# Patient Record
Sex: MALE | Race: WHITE | NOT HISPANIC OR LATINO | Employment: FULL TIME | RURAL
[De-identification: names, ages, dates, MRNs, and addresses within clinical notes are randomized per-mention and may not be internally consistent; named-entity substitution may affect disease eponyms.]

---

## 2017-07-24 ENCOUNTER — OFFICE VISIT (OUTPATIENT)
Dept: RETAIL CLINIC | Facility: CLINIC | Age: 27
End: 2017-07-24

## 2017-07-24 DIAGNOSIS — H61.21 HEARING LOSS DUE TO CERUMEN IMPACTION, RIGHT: Primary | ICD-10-CM

## 2017-07-24 PROCEDURE — 69210 REMOVE IMPACTED EAR WAX UNI: CPT | Performed by: NURSE PRACTITIONER

## 2017-07-25 VITALS — TEMPERATURE: 98.1 F

## 2017-07-25 NOTE — PROGRESS NOTES
Subjective   Bhavik Dewey is a 26 y.o. male.     Chief Complaint   Patient presents with   • Cerumen Impaction        HPI Comments: Was swimming with his children a few days ago.  Daughter forcefully squirted water directly in his right ear and he has had decreased hearing since that time.  No pain or other symptoms.  Did try to use OTC Swimmer's Ear drops without success/relief.   Has not had drainage or other problems from ear.  Decreased hearing makes it hard for him to perform work duties because he is a .        The following portions of the patient's history were reviewed and updated as appropriate: allergies, current medications, past family history, past medical history, past social history, past surgical history and problem list.    Review of Systems   HENT: Positive for hearing loss (right ear). Negative for congestion, ear discharge, ear pain, rhinorrhea, sinus pressure, sneezing and sore throat.    Neurological: Negative for dizziness and light-headedness.       Objective   Allergies   Allergen Reactions   • Amoxicillin Hives   • Ceclor [Cefaclor] Hives       Physical Exam   HENT:   Right Ear: No tenderness. A foreign body (large aayush cerumen impaction - unable to visualize TM pre-removal) is present. Decreased hearing is noted.   Left Ear: Tympanic membrane and ear canal normal.   Mouth/Throat: Uvula is midline, oropharynx is clear and moist and mucous membranes are normal.   Lymphadenopathy:     He has no cervical adenopathy.       Ear Cerumen Removal Instrumentation  Date/Time: 7/24/2017 3:00 PM  Performed by: BEAR CARR  Authorized by: BEAR CARR  Consent: Verbal consent obtained.  Risks and benefits: risks, benefits and alternatives were discussed  Consent given by: patient  Local anesthetic: none  Patient sedated: no  Patient tolerance: Patient tolerated the procedure well with no immediate complications  Comments: Large cerumen impaction removed using irrigation of  right ear canal using warm water and hydrogen peroxide and curette.  Impaction was difficult to remove and took approx 20 minutes of irrigation/curette use, but pt. Tolerated well.   Post removal, canal with slight erythema but skin intact.  TM normal.  Pt. Reported immediate improvement in hearing and denied complaints.             Assessment/Plan   Bhavik was seen today for cerumen impaction.    Diagnoses and all orders for this visit:    Hearing loss due to cerumen impaction, right

## 2018-09-20 ENCOUNTER — OFFICE VISIT (OUTPATIENT)
Dept: PULMONOLOGY | Facility: CLINIC | Age: 28
End: 2018-09-20

## 2018-09-20 VITALS
TEMPERATURE: 98.2 F | BODY MASS INDEX: 31.52 KG/M2 | HEART RATE: 65 BPM | OXYGEN SATURATION: 98 % | RESPIRATION RATE: 16 BRPM | DIASTOLIC BLOOD PRESSURE: 70 MMHG | HEIGHT: 68 IN | WEIGHT: 208 LBS | SYSTOLIC BLOOD PRESSURE: 120 MMHG

## 2018-09-20 DIAGNOSIS — R06.02 SOB (SHORTNESS OF BREATH): Primary | ICD-10-CM

## 2018-09-20 DIAGNOSIS — R04.2 HEMOPTYSIS: ICD-10-CM

## 2018-09-20 DIAGNOSIS — J44.9 CHRONIC OBSTRUCTIVE PULMONARY DISEASE, UNSPECIFIED COPD TYPE (HCC): ICD-10-CM

## 2018-09-20 LAB
ALBUMIN SERPL-MCNC: 4.8 G/DL (ref 3.2–4.8)
ALBUMIN/GLOB SERPL: 2 G/DL (ref 1.5–2.5)
ALP SERPL-CCNC: 73 U/L (ref 25–100)
ALT SERPL W P-5'-P-CCNC: 27 U/L (ref 7–40)
ANION GAP SERPL CALCULATED.3IONS-SCNC: 7 MMOL/L (ref 3–11)
APTT PPP: 28 SECONDS (ref 24–31)
AST SERPL-CCNC: 29 U/L (ref 0–33)
BASOPHILS # BLD AUTO: 0.03 10*3/MM3 (ref 0–0.2)
BASOPHILS NFR BLD AUTO: 0.3 % (ref 0–1)
BILIRUB SERPL-MCNC: 1 MG/DL (ref 0.3–1.2)
BUN BLD-MCNC: 12 MG/DL (ref 9–23)
BUN/CREAT SERPL: 14.3 (ref 7–25)
CALCIUM SPEC-SCNC: 9.4 MG/DL (ref 8.7–10.4)
CHLORIDE SERPL-SCNC: 103 MMOL/L (ref 99–109)
CO2 SERPL-SCNC: 28 MMOL/L (ref 20–31)
CREAT BLD-MCNC: 0.84 MG/DL (ref 0.6–1.3)
DEPRECATED RDW RBC AUTO: 39.9 FL (ref 37–54)
EOSINOPHIL # BLD AUTO: 0.26 10*3/MM3 (ref 0–0.3)
EOSINOPHIL NFR BLD AUTO: 2.6 % (ref 0–3)
ERYTHROCYTE [DISTWIDTH] IN BLOOD BY AUTOMATED COUNT: 12.6 % (ref 11.3–14.5)
GFR SERPL CREATININE-BSD FRML MDRD: 108 ML/MIN/1.73
GLOBULIN UR ELPH-MCNC: 2.4 GM/DL
GLUCOSE BLD-MCNC: 84 MG/DL (ref 70–100)
HCT VFR BLD AUTO: 47.9 % (ref 38.9–50.9)
HGB BLD-MCNC: 16.3 G/DL (ref 13.1–17.5)
IMM GRANULOCYTES # BLD: 0.04 10*3/MM3 (ref 0–0.03)
IMM GRANULOCYTES NFR BLD: 0.4 % (ref 0–0.6)
INR PPP: 0.98 (ref 0.91–1.09)
LYMPHOCYTES # BLD AUTO: 2.61 10*3/MM3 (ref 0.6–4.8)
LYMPHOCYTES NFR BLD AUTO: 25.7 % (ref 24–44)
MCH RBC QN AUTO: 29.9 PG (ref 27–31)
MCHC RBC AUTO-ENTMCNC: 34 G/DL (ref 32–36)
MCV RBC AUTO: 87.7 FL (ref 80–99)
MONOCYTES # BLD AUTO: 0.8 10*3/MM3 (ref 0–1)
MONOCYTES NFR BLD AUTO: 7.9 % (ref 0–12)
NEUTROPHILS # BLD AUTO: 6.47 10*3/MM3 (ref 1.5–8.3)
NEUTROPHILS NFR BLD AUTO: 63.5 % (ref 41–71)
PLATELET # BLD AUTO: 217 10*3/MM3 (ref 150–450)
PMV BLD AUTO: 12.6 FL (ref 6–12)
POTASSIUM BLD-SCNC: 3.8 MMOL/L (ref 3.5–5.5)
PROT SERPL-MCNC: 7.2 G/DL (ref 5.7–8.2)
PROTHROMBIN TIME: 10.3 SECONDS (ref 9.6–11.5)
RBC # BLD AUTO: 5.46 10*6/MM3 (ref 4.2–5.76)
SODIUM BLD-SCNC: 138 MMOL/L (ref 132–146)
WBC NRBC COR # BLD: 10.17 10*3/MM3 (ref 3.5–10.8)

## 2018-09-20 PROCEDURE — 82103 ALPHA-1-ANTITRYPSIN TOTAL: CPT | Performed by: INTERNAL MEDICINE

## 2018-09-20 PROCEDURE — 82104 ALPHA-1-ANTITRYPSIN PHENO: CPT | Performed by: INTERNAL MEDICINE

## 2018-09-20 PROCEDURE — 85025 COMPLETE CBC W/AUTO DIFF WBC: CPT | Performed by: INTERNAL MEDICINE

## 2018-09-20 PROCEDURE — 94060 EVALUATION OF WHEEZING: CPT | Performed by: INTERNAL MEDICINE

## 2018-09-20 PROCEDURE — 80053 COMPREHEN METABOLIC PANEL: CPT | Performed by: INTERNAL MEDICINE

## 2018-09-20 PROCEDURE — 94726 PLETHYSMOGRAPHY LUNG VOLUMES: CPT | Performed by: INTERNAL MEDICINE

## 2018-09-20 PROCEDURE — 85610 PROTHROMBIN TIME: CPT | Performed by: INTERNAL MEDICINE

## 2018-09-20 PROCEDURE — 85730 THROMBOPLASTIN TIME PARTIAL: CPT | Performed by: INTERNAL MEDICINE

## 2018-09-20 PROCEDURE — 99204 OFFICE O/P NEW MOD 45 MIN: CPT | Performed by: INTERNAL MEDICINE

## 2018-09-20 PROCEDURE — 36415 COLL VENOUS BLD VENIPUNCTURE: CPT | Performed by: INTERNAL MEDICINE

## 2018-09-20 PROCEDURE — 94729 DIFFUSING CAPACITY: CPT | Performed by: INTERNAL MEDICINE

## 2018-09-20 RX ORDER — ALBUTEROL SULFATE 90 UG/1
4 AEROSOL, METERED RESPIRATORY (INHALATION) ONCE
Status: COMPLETED | OUTPATIENT
Start: 2018-09-20 | End: 2018-09-20

## 2018-09-20 RX ORDER — VARENICLINE TARTRATE 1 MG/1
TABLET, FILM COATED ORAL
COMMUNITY
Start: 2018-06-22 | End: 2022-11-04

## 2018-09-20 RX ADMIN — ALBUTEROL SULFATE 4 PUFF: 90 AEROSOL, METERED RESPIRATORY (INHALATION) at 12:22

## 2018-09-20 NOTE — PATIENT INSTRUCTIONS
1. Labs today, including Alpha 1 antitrypsin level and phenotype  2. CT chest with contrast, indication: hemoptysis  3. RTC with results of CT, within a week.  4. Based on findings, we may need to consider Bronchoscopy

## 2018-09-20 NOTE — PROGRESS NOTES
"Pulmonary Clinic  Initial Office Evaluation     REFERRING PHYSICIAN:  Richard Briscoe MD    Subjective   Reason for Visit:   Mr. Bhavik Dewey is a 28 y.o. male, who presents to [facility] at [place], [place], for evaluation and management of Cough      SUBJECTIVE     Smoker, who las May, had an episode of bronchitis, that lasted few weeks. During that time, he was awakening up, coughing, and \"spitting up\" some blood, mostly blood specs, less than a teaspoon.    He had a round of antibiotics and prednisone. He got better. He has not had any hemoptysis for almost 3 months.    He continues to smoke, ~ 1 ppd. He never started the Chantix.    PMH: He  has a past medical history of Achalasia.   PSxH: He  has no past surgical history on file.     There is no immunization history on file for this patient.    Medications:     Current Outpatient Prescriptions:   •  CHANTIX CONTINUING MONTH KT 1 MG tablet, , Disp: , Rfl:   •  CHANTIX STARTING MONTH KT 0.5 MG X 11 & 1 MG X 42 tablet, , Disp: , Rfl:   No current facility-administered medications for this visit.     Allergies: He is allergic to amoxicillin and ceclor [cefaclor].   FH: His family history includes Brain cancer in his paternal grandfather; Colon cancer in his maternal grandfather; Congenital heart disease in his maternal grandfather; Diabetes in his maternal grandfather, maternal grandmother, and mother; Heart disease in his maternal grandmother; Kidney cancer in his paternal grandfather; Kidney disease in his maternal grandmother; Ovarian cancer in his paternal grandfather.   SH: He  reports that he has been smoking Electronic Cigarette.  He has a 14.00 pack-year smoking history. He has never used smokeless tobacco. He reports that he drinks about 1.2 - 1.8 oz of alcohol per week . He reports that he does not use drugs.  Other       There is no immunization history on file for this patient.     The patient's relevant past medical, " "surgical and social history were reviewed and updated in Epic as appropriate.    ROS (14):   Review of Systems   Constitutional: Negative for fever.   HENT: Positive for congestion, dental problem, sneezing and sore throat.    Respiratory: Positive for cough.    Cardiovascular: Negative for chest pain.   Gastrointestinal: Negative for nausea and vomiting.   Psychiatric/Behavioral: Positive for sleep disturbance (male, >50). Negative for confusion.   All other systems reviewed and are negative.      All other systems reviewed and are negative.      Objective   OBJECTIVE     Physical Examination   Vitals:    09/20/18 1349   BP: 120/70   Pulse: 65   Resp: 16   Temp: 98.2 °F (36.8 °C)   SpO2: 98%  Comment: resting, RA   Weight: 94.3 kg (208 lb)   Height: 171.5 cm (67.5\")       Body mass index is 32.1 kg/m².    Physical Examination    Constitutional:  No acute distress.   Neck:  Normal nasal mucosa and turbinates. Normal teeth.  Oropharynx clear.  Normal range of motion. Neck supple.   No JVD present. No tracheal deviation present.  No thyromegaly present.    Cardiovascular: Normal rate, regular and rhythm. Normal heart sounds.  No murmurs, gallop or rub.  No peripheral edema.   Respiratory: No respiratory distress. Normal respiratory effort.  Normal breath sounds  Clear to auscultation and percussion.    Abdominal:  Soft. No masses. Non-tender. No distension. No HSM.   Extremities: No digital cyanosis. No clubbing.   Lymphadenopathy: None.   Skin: Skin is warm and dry. No rashes, lesions or ulcers noted.    Neurological:   Alert and Oriented to person, place, and time.    Psychiatric:  Normal affect. Normal behavior.     Diagnostic Data    CXR 09/20/18  NAD     No radiology results for the last 30 days.     PFTs 9/20/2018:  Mild airway obstruction. (FEV1 > 70% pred.).  The maximum voluntary ventilation (MVV) is normal.  The elevated Residual Volume is consistent with Air Trapping.  The diffusing capacity is " normal.    SpO2 Readings from Last 3 Encounters:   09/20/18 98%        Assessment/Plan   ASSESSMENT / PLAN     Assessment:    1. Hemoptysis, small amount, May/Jun 2018, none for 3 months.  1. He denies epistaxis.  2. Mild OAD, pre-bronchodilators.  2. Sinusitis  3. Tobacco use  4. Achalasia, s/p EGD and robotic surgery when he was 14 yo    Plan:  Orders Placed This Encounter   Procedures   • XR Chest PA & Lateral   • Alpha - 1 - Antitrypsin   • Alpha - 1 - Antitrypsin Phenotype   • aPTT   • Protime-INR   • Comprehensive Metabolic Panel   • CBC Auto Differential   • Pulmonary Function Test   • CBC & Differential       Patient Instructions   1. Labs today, including Alpha 1 antitrypsin level and phenotype  2. CT chest with contrast, indication: hemoptysis  3. RTC with results of CT, within a week.  4. Based on findings, we may need to consider Bronchoscopy      Return in about 1 week (around 9/27/2018).    I discussed the diagnosis, evaluation, and  treatment options with the patient and/or appropriate family members.    Thank you very much for allowing me to participate in the care of your patient.    I spent 45 minutes with the patient and family. I spent > 50% percent of this time counseling and discussing diagnostic testing.    Lalo Dela Cruz MD, FACP, FCCP, [facility]  Intensive Care Medicine and Pulmonary Medicine     C.C.: Richard Briscoe MD

## 2018-09-21 DIAGNOSIS — R04.2 HEMOPTYSIS: Primary | ICD-10-CM

## 2018-09-22 LAB — A1AT SERPL-MCNC: 144 MG/DL (ref 90–200)

## 2018-09-24 LAB
A1AT SERPL-MCNC: 143 MG/DL (ref 90–200)
PHENOTYPE: NORMAL

## 2018-09-28 ENCOUNTER — APPOINTMENT (OUTPATIENT)
Dept: CT IMAGING | Facility: HOSPITAL | Age: 28
End: 2018-09-28
Attending: INTERNAL MEDICINE

## 2018-10-02 ENCOUNTER — HOSPITAL ENCOUNTER (OUTPATIENT)
Dept: CT IMAGING | Facility: HOSPITAL | Age: 28
Discharge: HOME | End: 2018-10-02
Attending: INTERNAL MEDICINE | Admitting: INTERNAL MEDICINE

## 2018-10-02 ENCOUNTER — OFFICE VISIT (OUTPATIENT)
Dept: PULMONOLOGY | Facility: CLINIC | Age: 28
End: 2018-10-02

## 2018-10-02 VITALS
HEIGHT: 67 IN | WEIGHT: 208 LBS | HEART RATE: 80 BPM | DIASTOLIC BLOOD PRESSURE: 72 MMHG | OXYGEN SATURATION: 98 % | SYSTOLIC BLOOD PRESSURE: 120 MMHG | TEMPERATURE: 97.8 F | BODY MASS INDEX: 32.65 KG/M2

## 2018-10-02 DIAGNOSIS — R04.2 HEMOPTYSIS: Primary | ICD-10-CM

## 2018-10-02 DIAGNOSIS — R04.2 HEMOPTYSIS: ICD-10-CM

## 2018-10-02 DIAGNOSIS — K22.0 ACHALASIA: ICD-10-CM

## 2018-10-02 PROCEDURE — 99213 OFFICE O/P EST LOW 20 MIN: CPT | Performed by: NURSE PRACTITIONER

## 2018-10-02 PROCEDURE — 71260 CT THORAX DX C+: CPT

## 2018-10-02 PROCEDURE — 25010000002 IOPAMIDOL 61 % SOLUTION: Performed by: INTERNAL MEDICINE

## 2018-10-02 RX ADMIN — IOPAMIDOL 85 ML: 612 INJECTION, SOLUTION INTRAVENOUS at 11:09

## 2018-10-02 NOTE — PROGRESS NOTES
[facility] Pulmonary Follow up    CHIEF COMPLAINT    Follow-up on CT    HISTORY OF PRESENT ILLNESS    Bhavik Dewey is a 28 y.o.male here today for follow-up on his CT scan, he saw Dr. Lalo Dela Cruz 9/20 due to some hemoptysis.  This initially began in February when he had an episode of acute bronchitis with continued cough and congestion, he then developed some intermittent hemoptysis - bright red - TBSP at time.  He's had no episodes since probably June.    He does have a history of achalasia with surgical repair when he was 14.  He does have occasional reflux symptoms.  He denies any dysphagia.  He sometimes can fill things stick, and he drinks water to help.        Patient Active Problem List   Diagnosis   • Achalasia   • Hemoptysis       Allergies   Allergen Reactions   • Amoxicillin Hives   • Ceclor [Cefaclor] Hives       Current Outpatient Prescriptions:   •  CHANTIX CONTINUING MONTH KT 1 MG tablet, , Disp: , Rfl:   •  CHANTIX STARTING MONTH KT 0.5 MG X 11 & 1 MG X 42 tablet, , Disp: , Rfl:   No current facility-administered medications for this visit.   MEDICATION LIST AND ALLERGIES REVIEWED.    Social History   Substance Use Topics   • Smoking status: Current Every Day Smoker     Packs/day: 1.00     Years: 14.00     Types: Electronic Cigarette   • Smokeless tobacco: Never Used   • Alcohol use 1.2 - 1.8 oz/week     2 - 3 Shots of liquor per week       FAMILY AND SOCIAL HISTORY REVIEWED.    Review of Systems   Constitutional: Negative for chills, fatigue, fever and unexpected weight change.   HENT: Negative for congestion, nosebleeds, postnasal drip, rhinorrhea, sinus pressure and trouble swallowing.    Respiratory: Negative for cough, chest tightness, shortness of breath and wheezing.    Cardiovascular: Negative for chest pain and leg swelling.   Gastrointestinal: Negative for abdominal pain, constipation, diarrhea, nausea and vomiting.   Genitourinary: Negative for dysuria, frequency, hematuria and urgency.  "  Musculoskeletal: Negative for myalgias.   Neurological: Negative for dizziness, weakness, numbness and headaches.   All other systems reviewed and are negative.  .    /72   Pulse 80   Temp 97.8 °F (36.6 °C)   Ht 170.2 cm (67\")   Wt 94.3 kg (208 lb)   SpO2 98% Comment: RA @ REST  BMI 32.58 kg/m²       There is no immunization history on file for this patient.    Physical Exam   Constitutional: He is oriented to person, place, and time. He appears well-developed and well-nourished.   HENT:   Head: Normocephalic and atraumatic.   Eyes: Pupils are equal, round, and reactive to light. EOM are normal.   Neck: Normal range of motion. Neck supple.   Cardiovascular: Normal rate and regular rhythm.    No murmur heard.  Pulmonary/Chest: Effort normal and breath sounds normal. No respiratory distress. He has no wheezes. He has no rales.   Abdominal: Soft. Bowel sounds are normal. He exhibits no distension.   Musculoskeletal: Normal range of motion. He exhibits no edema.   Neurological: He is alert and oriented to person, place, and time.   Skin: Skin is warm and dry. No erythema.   Psychiatric: He has a normal mood and affect. His behavior is normal.   Vitals reviewed.        RESULTS    CT Chest 10/2/18  FINDINGS:  Central airways are patent without endobronchial lesion or  debris.  Heart size is within normal limits.  No pericardial effusion.  There is no suspicious thoracic lymph node using size or morphologic  criteria. No concerning noncalcified lung nodule or mass. Calcified  granuloma left lower lobe. Calcified left hilar lymph node.  There is no  pleural effusion. Bilateral gynecomastia. Dilated, patulous fluid-filled  esophagus.  No acute or aggressive osseous lesion is seen. Incidental  imaging of the upper abdomen is unremarkable.     IMPRESSION:     1. No acute cardiopulmonary findings.  2. Calcified granuloma left lung base and left hilum.  3. Patulous fluid-filled esophagus. Concern is for obstruction " at the GE  junction, possibly achalasia or stricture.       PROBLEM LIST    Problem List Items Addressed This Visit        Respiratory    Hemoptysis - Primary       Digestive    Achalasia    Overview     S/p repair at age 14                 DISCUSSION    Currently asymptomatic with most likely the cause of his hemoptysis after his acute bronchitis was from that calcified lymph node in his left hilum.  It is awful close to his airway.  We'll continue to follow for any further episodes of hemoptysis.  We did discuss the possibility of a bronchoscopy in the office today for airway evaluation.  He would like to hold off on this time.    We also looked at his esophagus and the findings there, he does have history of achalasia with frequent heartburn symptoms and difficulty in food passing.  I like him follow-up with gastroenterology for a EGD in the near future or evaluation.    Follow-up in 6 months.    I spent 15 minutes with the patient and family. I spent > 50% percent of this time counseling and discussing diagnostic testing, evaluation, treatment options and management.    Andreia Larsen, FLORA  10/02/89898:02 PM  Electronically signed     Please note that portions of this note were completed with a voice recognition program. Efforts were made to edit the dictations, but occasionally words are mistranscribed.      CC: Richard Briscoe MD

## 2022-11-04 ENCOUNTER — OFFICE VISIT (OUTPATIENT)
Dept: FAMILY MEDICINE CLINIC | Facility: CLINIC | Age: 32
End: 2022-11-04

## 2022-11-04 VITALS
WEIGHT: 227.6 LBS | SYSTOLIC BLOOD PRESSURE: 126 MMHG | DIASTOLIC BLOOD PRESSURE: 78 MMHG | RESPIRATION RATE: 18 BRPM | HEIGHT: 69 IN | BODY MASS INDEX: 33.71 KG/M2 | HEART RATE: 87 BPM | OXYGEN SATURATION: 98 % | TEMPERATURE: 97.1 F

## 2022-11-04 DIAGNOSIS — H61.21 IMPACTED CERUMEN OF RIGHT EAR: Primary | ICD-10-CM

## 2022-11-04 DIAGNOSIS — H91.92 HEARING DECREASED, LEFT: ICD-10-CM

## 2022-11-04 PROCEDURE — 99214 OFFICE O/P EST MOD 30 MIN: CPT | Performed by: NURSE PRACTITIONER

## 2022-11-04 NOTE — PROGRESS NOTES
Office Note     Name: Bhavik Dewey    : 1990     MRN: [id number]     Chief Complaint  left ear pain    Subjective     History of Present Illness:  Bhavik Dewey is a 32 y.o. male who presents today for decreased hearing of his left ear. He reports this began around two weeks ago and actually has improved. He reports in the past when he has had these episodes he has had to have ear irrigation and that fixed the problem. Denies dizziness, tinnitus, loss of balance, facial numbness or tingling. No nausea or visual disturbance.  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 He has no further complaints today.     Review of Systems   Constitutional: Negative for fatigue and fever.   HENT: Positive for hearing loss. Negative for ear discharge, ear pain, facial swelling, nosebleeds, postnasal drip, rhinorrhea, sinus pressure, sore throat, swollen glands, tinnitus and trouble swallowing.    Eyes: Negative for blurred vision, double vision, photophobia, pain and visual disturbance.   Cardiovascular: Negative for chest pain, palpitations and leg swelling.   Musculoskeletal: Negative for arthralgias, gait problem, joint swelling, myalgias, neck pain and neck stiffness.   Skin: Negative for rash.   Neurological: Negative for dizziness, tremors, syncope, facial asymmetry, speech difficulty, weakness, light-headedness, numbness, headache and confusion.       Objective     Past Medical History:   Diagnosis Date   • Abnormal CT scan     2012, with an incidental pulmonary nodule involving the left lung base measuring 1 cm.  As a result no CT scan for comparison, three-month followup recommended.  Never carried out.  Thought at that time was that it was very likely histoplasmosis.   • Achalasia     status post Heller myotomy , as discussed above.   • Allergic rhinitis    • Essential hypertension    • GERD (gastroesophageal reflux disease)     status post EGD   "Dex 2/9/2007, esophagitis.   • Obesity    • Sinusitis    • Tobacco abuse    • Varicose veins of left lower extremity with other complications    • Venous insufficiency (chronic) (peripheral)    • Viral infection      Past Surgical History:   Procedure Laterality Date   • ENDOSCOPY  02/09/2007   • HELLER MYOTOMY LAPAROSCOPIC WITH ENDOSCOPY  2005     Family History   Problem Relation Age of Onset   • Diabetes Mother    • Diabetes Maternal Grandmother    • Kidney disease Maternal Grandmother    • Heart disease Maternal Grandmother    • Diabetes Maternal Grandfather    • Congenital heart disease Maternal Grandfather    • Colon cancer Maternal Grandfather    • Ovarian cancer Paternal Grandfather    • Brain cancer Paternal Grandfather    • Kidney cancer Paternal Grandfather        Vital Signs  /78 (BP Location: Left arm, Patient Position: Sitting, Cuff Size: Adult)   Pulse 87   Temp 97.1 °F (36.2 °C) (Temporal)   Resp 18   Ht 175.3 cm (69\")   Wt 103 kg (227 lb 9.6 oz)   SpO2 98%   BMI 33.61 kg/m²   Estimated body mass index is 33.61 kg/m² as calculated from the following:    Height as of this encounter: 175.3 cm (69\").    Weight as of this encounter: 103 kg (227 lb 9.6 oz).    Physical Exam  Constitutional:       Appearance: Normal appearance.   HENT:      Head: Normocephalic and atraumatic.      Right Ear: There is impacted cerumen.      Left Ear: Ear canal and external ear normal.      Ears:      Comments: Left serous effusion noted     Nose: Nose normal.      Mouth/Throat:      Mouth: Mucous membranes are moist.      Pharynx: Oropharynx is clear.   Eyes:      Conjunctiva/sclera: Conjunctivae normal.      Pupils: Pupils are equal, round, and reactive to light.   Cardiovascular:      Rate and Rhythm: Normal rate and regular rhythm.      Pulses: Normal pulses.      Heart sounds: Normal heart sounds.   Pulmonary:      Effort: Pulmonary effort is normal.      Breath sounds: Normal breath sounds. "   Abdominal:      General: Bowel sounds are normal.      Palpations: Abdomen is soft.   Musculoskeletal:         General: Normal range of motion.      Cervical back: Normal range of motion and neck supple.   Skin:     General: Skin is warm and dry.      Capillary Refill: Capillary refill takes less than 2 seconds.   Neurological:      General: No focal deficit present.      Mental Status: He is alert and oriented to person, place, and time.      Motor: No weakness.      Coordination: Coordination normal.      Gait: Gait normal.   Psychiatric:         Mood and Affect: Mood normal.         Behavior: Behavior normal.         Thought Content: Thought content normal.         Judgment: Judgment normal.          POCT Results (if applicable):  Results for orders placed or performed in visit on 09/20/18   Alpha - 1 - Antitrypsin    Specimen: Blood   Result Value Ref Range    A-1 Antitrypsin 144 90 - 200 mg/dL   Alpha - 1 - Antitrypsin Phenotype    Specimen: Blood   Result Value Ref Range    A-1 Antitrypsin 143 90 - 200 mg/dL    Phenotype MM    aPTT    Specimen: Blood   Result Value Ref Range    PTT 28.0 24.0 - 31.0 seconds   Protime-INR    Specimen: Blood   Result Value Ref Range    Protime 10.3 9.6 - 11.5 Seconds    INR 0.98 0.91 - 1.09   Comprehensive Metabolic Panel    Specimen: Blood   Result Value Ref Range    Glucose 84 70 - 100 mg/dL    BUN 12 9 - 23 mg/dL    Creatinine 0.84 0.60 - 1.30 mg/dL    Sodium 138 132 - 146 mmol/L    Potassium 3.8 3.5 - 5.5 mmol/L    Chloride 103 99 - 109 mmol/L    CO2 28.0 20.0 - 31.0 mmol/L    Calcium 9.4 8.7 - 10.4 mg/dL    Total Protein 7.2 5.7 - 8.2 g/dL    Albumin 4.80 3.20 - 4.80 g/dL    ALT (SGPT) 27 7 - 40 U/L    AST (SGOT) 29 0 - 33 U/L    Alkaline Phosphatase 73 25 - 100 U/L    Total Bilirubin 1.0 0.3 - 1.2 mg/dL    eGFR Non African Amer 108 >60 mL/min/1.73    Globulin 2.4 gm/dL    A/G Ratio 2.0 1.5 - 2.5 g/dL    BUN/Creatinine Ratio 14.3 7.0 - 25.0    Anion Gap 7.0 3.0 - 11.0  mmol/L   CBC Auto Differential    Specimen: Blood   Result Value Ref Range    WBC 10.17 3.50 - 10.80 10*3/mm3    RBC 5.46 4.20 - 5.76 10*6/mm3    Hemoglobin 16.3 13.1 - 17.5 g/dL    Hematocrit 47.9 38.9 - 50.9 %    MCV 87.7 80.0 - 99.0 fL    MCH 29.9 27.0 - 31.0 pg    MCHC 34.0 32.0 - 36.0 g/dL    RDW 12.6 11.3 - 14.5 %    RDW-SD 39.9 37.0 - 54.0 fl    MPV 12.6 (H) 6.0 - 12.0 fL    Platelets 217 150 - 450 10*3/mm3    Neutrophil % 63.5 41.0 - 71.0 %    Lymphocyte % 25.7 24.0 - 44.0 %    Monocyte % 7.9 0.0 - 12.0 %    Eosinophil % 2.6 0.0 - 3.0 %    Basophil % 0.3 0.0 - 1.0 %    Immature Grans % 0.4 0.0 - 0.6 %    Neutrophils, Absolute 6.47 1.50 - 8.30 10*3/mm3    Lymphocytes, Absolute 2.61 0.60 - 4.80 10*3/mm3    Monocytes, Absolute 0.80 0.00 - 1.00 10*3/mm3    Eosinophils, Absolute 0.26 0.00 - 0.30 10*3/mm3    Basophils, Absolute 0.03 0.00 - 0.20 10*3/mm3    Immature Grans, Absolute 0.04 (H) 0.00 - 0.03 10*3/mm3            Assessment and Plan     Diagnoses and all orders for this visit:    1. Impacted cerumen of right ear (Primary)  Assessment & Plan:  -irrigation performed.  TM clear, non bulging, no effusion noted    Orders:  -     Ear Cerumen Removal    2. Hearing decreased, left  Assessment & Plan:  e reports this began around two weeks ago and actually has improved since his wife cleaned out his ears. He reports in the past when he has had these episodes he has had to have ear irrigation and that fixed the problem. Denies dizziness, tinnitus, loss of balance, facial numbness or tingling. No nausea or visual disturbance.  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    -No cerumen impaction noted on exam  -Positive for serous middle ear effusion  -Advised flonase two sprays each nostril once daily  -Advised use of daily certrizine                Follow Up  No follow-ups on file.    Jenifer Le, APRN

## 2022-11-04 NOTE — ASSESSMENT & PLAN NOTE
e reports this began around two weeks ago and actually has improved since his wife cleaned out his ears. He reports in the past when he has had these episodes he has had to have ear irrigation and that fixed the problem. Denies dizziness, tinnitus, loss of balance, facial numbness or tingling. No nausea or visual disturbance.  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    -No cerumen impaction noted on exam  -Positive for serous middle ear effusion  -Advised flonase two sprays each nostril once daily  -Advised use of daily certrizine

## 2023-11-17 ENCOUNTER — OFFICE VISIT (OUTPATIENT)
Dept: FAMILY MEDICINE CLINIC | Facility: CLINIC | Age: 33
End: 2023-11-17
Payer: COMMERCIAL

## 2023-11-17 VITALS
RESPIRATION RATE: 18 BRPM | HEIGHT: 69 IN | HEART RATE: 113 BPM | BODY MASS INDEX: 33.62 KG/M2 | WEIGHT: 227 LBS | OXYGEN SATURATION: 98 % | DIASTOLIC BLOOD PRESSURE: 74 MMHG | SYSTOLIC BLOOD PRESSURE: 126 MMHG | TEMPERATURE: 98.6 F

## 2023-11-17 DIAGNOSIS — M25.571 ACUTE RIGHT ANKLE PAIN: Primary | ICD-10-CM

## 2023-11-17 DIAGNOSIS — M79.604 PAIN OF RIGHT LOWER EXTREMITY DUE TO INJURY: ICD-10-CM

## 2023-11-17 PROCEDURE — 99213 OFFICE O/P EST LOW 20 MIN: CPT | Performed by: NURSE PRACTITIONER

## 2023-11-17 NOTE — PROGRESS NOTES
Office Note     Name: Bhavik Dewey    : 1990     MRN: [id number]     Chief Complaint  right ankle pain    Subjective     History of Present Illness:  Bhavik Deewy is a 33 y.o. male who presents today for complaints of right ankle pain.  Patient reports 8 days ago he jumped off of his front porch onto the ground.  Unfortunately his right foot went into a deep hole causing him to turn his ankle completely over.  Patient notes extreme swelling, bruising and pain fullness since that time.  He is continue to weight-bear regularly since that time but with significant discomfort.  He has utilized Epsom salt bath as well as over-the-counter analgesics without much benefit.  He denies any decreased range of motion, only pain fullness and swelling.  Diffuse bruising on the dorsal portion of the foot and toes.  He denies any numbness, tingling or cold feeling of his foot.  He has no further complaints or concerns today    Review of Systems   Constitutional:  Negative for activity change and fatigue.   Gastrointestinal:  Negative for abdominal pain, diarrhea, nausea and vomiting.   Musculoskeletal:  Positive for arthralgias and joint swelling.   Skin:  Positive for bruise.   Neurological:  Negative for dizziness, weakness, light-headedness, numbness and headache.       Objective     Past Medical History:   Diagnosis Date    Abnormal CT scan     2012, with an incidental pulmonary nodule involving the left lung base measuring 1 cm.  As a result no CT scan for comparison, three-month followup recommended.  Never carried out.  Thought at that time was that it was very likely histoplasmosis.    Achalasia     status post Heller myotomy , as discussed above.    Allergic rhinitis     Essential hypertension     GERD (gastroesophageal reflux disease)     status post EGD Dr. Kowalski 2007, esophagitis.    Obesity     Sinusitis     Tobacco abuse     Varicose veins of left lower extremity with other complications   "   Venous insufficiency (chronic) (peripheral)     Viral infection      Past Surgical History:   Procedure Laterality Date    ENDOSCOPY  02/09/2007    HELLER MYOTOMY LAPAROSCOPIC WITH ENDOSCOPY  2005     Family History   Problem Relation Age of Onset    Diabetes Mother     Diabetes Maternal Grandmother     Kidney disease Maternal Grandmother     Heart disease Maternal Grandmother     Diabetes Maternal Grandfather     Congenital heart disease Maternal Grandfather     Colon cancer Maternal Grandfather     Ovarian cancer Paternal Grandfather     Brain cancer Paternal Grandfather     Kidney cancer Paternal Grandfather        Vital Signs  /74 (BP Location: Left arm, Patient Position: Sitting, Cuff Size: Adult)   Pulse 113   Temp 98.6 °F (37 °C) (Temporal)   Resp 18   Ht 175.3 cm (69\")   Wt 103 kg (227 lb)   SpO2 98%   BMI 33.52 kg/m²   Estimated body mass index is 33.52 kg/m² as calculated from the following:    Height as of this encounter: 175.3 cm (69\").    Weight as of this encounter: 103 kg (227 lb).    Physical Exam  Vitals reviewed.   Constitutional:       Appearance: Normal appearance.   HENT:      Head: Normocephalic and atraumatic.   Cardiovascular:      Rate and Rhythm: Normal rate and regular rhythm.      Pulses: Normal pulses.           Dorsalis pedis pulses are 1+ on the right side.        Posterior tibial pulses are 1+ on the right side.      Heart sounds: Normal heart sounds.   Pulmonary:      Effort: Pulmonary effort is normal.      Breath sounds: Normal breath sounds.   Musculoskeletal:      Right lower leg: Swelling, tenderness and bony tenderness present. 1+      Right ankle: Swelling and ecchymosis present. Tenderness present over the CF ligament and proximal fibula. Normal range of motion.      Right Achilles Tendon: Normal.      Right foot: Normal range of motion.   Feet:      Right foot:      Protective Sensation: 6 sites tested.  6 sites sensed.   Skin:     General: Skin is warm and " dry.   Neurological:      Mental Status: He is alert and oriented to person, place, and time.               POCT Results (if applicable):  Results for orders placed or performed in visit on 09/20/18   Alpha - 1 - Antitrypsin    Specimen: Blood   Result Value Ref Range    A-1 Antitrypsin 144 90 - 200 mg/dL   Alpha - 1 - Antitrypsin Phenotype    Specimen: Blood   Result Value Ref Range    A-1 Antitrypsin 143 90 - 200 mg/dL    Phenotype MM    aPTT    Specimen: Blood   Result Value Ref Range    PTT 28.0 24.0 - 31.0 seconds   Protime-INR    Specimen: Blood   Result Value Ref Range    Protime 10.3 9.6 - 11.5 Seconds    INR 0.98 0.91 - 1.09   Comprehensive Metabolic Panel    Specimen: Blood   Result Value Ref Range    Glucose 84 70 - 100 mg/dL    BUN 12 9 - 23 mg/dL    Creatinine 0.84 0.60 - 1.30 mg/dL    Sodium 138 132 - 146 mmol/L    Potassium 3.8 3.5 - 5.5 mmol/L    Chloride 103 99 - 109 mmol/L    CO2 28.0 20.0 - 31.0 mmol/L    Calcium 9.4 8.7 - 10.4 mg/dL    Total Protein 7.2 5.7 - 8.2 g/dL    Albumin 4.80 3.20 - 4.80 g/dL    ALT (SGPT) 27 7 - 40 U/L    AST (SGOT) 29 0 - 33 U/L    Alkaline Phosphatase 73 25 - 100 U/L    Total Bilirubin 1.0 0.3 - 1.2 mg/dL    eGFR Non African Amer 108 >60 mL/min/1.73    Globulin 2.4 gm/dL    A/G Ratio 2.0 1.5 - 2.5 g/dL    BUN/Creatinine Ratio 14.3 7.0 - 25.0    Anion Gap 7.0 3.0 - 11.0 mmol/L   CBC Auto Differential    Specimen: Blood   Result Value Ref Range    WBC 10.17 3.50 - 10.80 10*3/mm3    RBC 5.46 4.20 - 5.76 10*6/mm3    Hemoglobin 16.3 13.1 - 17.5 g/dL    Hematocrit 47.9 38.9 - 50.9 %    MCV 87.7 80.0 - 99.0 fL    MCH 29.9 27.0 - 31.0 pg    MCHC 34.0 32.0 - 36.0 g/dL    RDW 12.6 11.3 - 14.5 %    RDW-SD 39.9 37.0 - 54.0 fl    MPV 12.6 (H) 6.0 - 12.0 fL    Platelets 217 150 - 450 10*3/mm3    Neutrophil % 63.5 41.0 - 71.0 %    Lymphocyte % 25.7 24.0 - 44.0 %    Monocyte % 7.9 0.0 - 12.0 %    Eosinophil % 2.6 0.0 - 3.0 %    Basophil % 0.3 0.0 - 1.0 %    Immature Grans % 0.4 0.0 -  0.6 %    Neutrophils, Absolute 6.47 1.50 - 8.30 10*3/mm3    Lymphocytes, Absolute 2.61 0.60 - 4.80 10*3/mm3    Monocytes, Absolute 0.80 0.00 - 1.00 10*3/mm3    Eosinophils, Absolute 0.26 0.00 - 0.30 10*3/mm3    Basophils, Absolute 0.03 0.00 - 0.20 10*3/mm3    Immature Grans, Absolute 0.04 (H) 0.00 - 0.03 10*3/mm3            Assessment and Plan     Diagnoses and all orders for this visit:    1. Acute right ankle pain (Primary)    2. Pain of right lower extremity due to injury  Assessment & Plan:  presents today for complaints of right ankle pain.  Patient reports 8 days ago he jumped off of his front porch onto the ground.  Unfortunately his right foot went into a deep hole causing him to turn his ankle completely over.  Patient notes extreme swelling, bruising and pain fullness since that time.  He is continue to weight-bear regularly since that time but with significant discomfort.  He has utilized Epsom salt bath as well as over-the-counter analgesics without much benefit.  He denies any decreased range of motion, only pain fullness and swelling.  Diffuse bruising on the dorsal portion of the foot and toes.  He denies any numbness, tingling or cold feeling of his foot.  -Patient instructed to continue elevation, cool compress.  Also advised getting foot and ankle brace until evaluation complete.  Patient being sent to [facility] for outpatient x-ray of ankle, foot and tib-fib.    Orders:  -     XR Ankle 3+ View Right; Future  -     XR Tibia Fibula 2 View Right; Future  -     XR Foot 3+ View Right; Future      BMI is >= 30 and <35. (Class 1 Obesity). The following options were offered after discussion;: exercise counseling/recommendations and nutrition counseling/recommendations        Follow Up  No follow-ups on file.    Jenifer Le, APRN

## 2023-11-17 NOTE — ASSESSMENT & PLAN NOTE
presents today for complaints of right ankle pain.  Patient reports 8 days ago he jumped off of his front porch onto the ground.  Unfortunately his right foot went into a deep hole causing him to turn his ankle completely over.  Patient notes extreme swelling, bruising and pain fullness since that time.  He is continue to weight-bear regularly since that time but with significant discomfort.  He has utilized Epsom salt bath as well as over-the-counter analgesics without much benefit.  He denies any decreased range of motion, only pain fullness and swelling.  Diffuse bruising on the dorsal portion of the foot and toes.  He denies any numbness, tingling or cold feeling of his foot.  -Patient instructed to continue elevation, cool compress.  Also advised getting foot and ankle brace until evaluation complete.  Patient being sent to [facility] for outpatient x-ray of ankle, foot and tib-fib.

## 2024-11-21 ENCOUNTER — OFFICE VISIT (OUTPATIENT)
Dept: FAMILY MEDICINE CLINIC | Facility: CLINIC | Age: 34
End: 2024-11-21
Payer: COMMERCIAL

## 2024-11-21 VITALS
HEIGHT: 69 IN | BODY MASS INDEX: 37.47 KG/M2 | TEMPERATURE: 98.2 F | SYSTOLIC BLOOD PRESSURE: 128 MMHG | OXYGEN SATURATION: 96 % | DIASTOLIC BLOOD PRESSURE: 84 MMHG | RESPIRATION RATE: 18 BRPM | HEART RATE: 120 BPM | WEIGHT: 253 LBS

## 2024-11-21 DIAGNOSIS — J40 BRONCHITIS: ICD-10-CM

## 2024-11-21 DIAGNOSIS — J40 BRONCHITIS: Primary | ICD-10-CM

## 2024-11-21 PROCEDURE — 99213 OFFICE O/P EST LOW 20 MIN: CPT | Performed by: NURSE PRACTITIONER

## 2024-11-21 RX ORDER — BROMPHENIRAMINE MALEATE, PSEUDOEPHEDRINE HYDROCHLORIDE, AND DEXTROMETHORPHAN HYDROBROMIDE 2; 30; 10 MG/5ML; MG/5ML; MG/5ML
10 SYRUP ORAL 4 TIMES DAILY PRN
Qty: 200 ML | Refills: 0 | Status: SHIPPED | OUTPATIENT
Start: 2024-11-21

## 2024-11-21 RX ORDER — PREDNISONE 20 MG/1
40 TABLET ORAL DAILY
Qty: 14 TABLET | Refills: 0 | Status: SHIPPED | OUTPATIENT
Start: 2024-11-21 | End: 2024-11-28

## 2024-11-21 RX ORDER — AZITHROMYCIN 250 MG/1
TABLET, FILM COATED ORAL
Qty: 6 TABLET | Refills: 0 | Status: SHIPPED | OUTPATIENT
Start: 2024-11-21

## 2024-11-21 NOTE — ASSESSMENT & PLAN NOTE
I am unsure if the hemp oil is safe, as i'm unsure if it's regulated through a dispensary.  If she is interested in getting certified for medical marijuana that is regulated in the State of [place], I can get her set up with a provider to certify her.    Karlene Arredondo, CNP     Patient with prolonged course of congestion in the chest which initially showed improvement but has now showed worsening over the last 3 to 4 days.  He is now having discomfort when taking deep inspirations.  Will treat for potential bacterial pathogen with azithromycin Z-Kojo.  Patient is also being sent for chest PA and lateral to rule out infiltrates.  Will treat with 40 mg daily prednisone for the next 7 days.  Patient also being given Bromfed for cough and congestion.  Patient will be contacted with his x-ray results when available.

## 2024-11-21 NOTE — PROGRESS NOTES
Office Note     Name: Bhavik Dewey    : 1990     MRN: [id number]     Chief Complaint  Cough    Subjective     History of Present Illness:  Bhavik Dewey is a 34 y.o. male who presents today for shortness of breath, cough and chest tightness.  Patient states his symptoms started anywhere between 2 and 3 weeks ago.  He states during the initial phases he experienced some chills but is unsure if he had a fever.  During that time he took over-the-counter cold and flu medications, Mucinex and utilized a nebulizer they have at home.  Patient states he felt as though he was improving but had sudden back set with return of severe nasal congestion and chest tightness.  Patient states his discomfort is predominantly on the backside of his chest when he takes a deep breath in.  He is also noted some shortness of breath but nothing he would call difficulty breathing and no wheezing.  Patient is afebrile in office today.  He is a 1 pack/day smoker for the last 14 years.  He has no further complaints or concerns today  Review of Systems   Constitutional:  Positive for chills and fatigue. Negative for fever.   HENT:  Positive for congestion, postnasal drip and sinus pressure.    Respiratory:  Positive for cough, chest tightness and shortness of breath. Negative for wheezing.    Cardiovascular:  Negative for palpitations and leg swelling.   Gastrointestinal:  Negative for abdominal pain, constipation, diarrhea, nausea and vomiting.   Musculoskeletal:  Negative for arthralgias and myalgias.   Skin:  Negative for rash.   Neurological:  Positive for weakness. Negative for dizziness, light-headedness and headache.       Objective     Past Medical History:   Diagnosis Date    Abnormal CT scan     2012, with an incidental pulmonary nodule involving the left lung base measuring 1 cm.  As a result no CT scan for comparison, three-month followup recommended.  Never carried out.  Thought at that time was that it was very  "likely histoplasmosis.    Achalasia     status post Heller myotomy 2005, as discussed above.    Allergic rhinitis     Essential hypertension     GERD (gastroesophageal reflux disease)     status post EGD Dr. Kowalski 2/9/2007, esophagitis.    Obesity     Sinusitis     Tobacco abuse     Varicose veins of left lower extremity with other complications     Venous insufficiency (chronic) (peripheral)     Viral infection      Past Surgical History:   Procedure Laterality Date    ENDOSCOPY  02/09/2007    HELLER MYOTOMY LAPAROSCOPIC WITH ENDOSCOPY  2005     Family History   Problem Relation Age of Onset    Diabetes Mother     Diabetes Maternal Grandmother     Kidney disease Maternal Grandmother     Heart disease Maternal Grandmother     Diabetes Maternal Grandfather     Congenital heart disease Maternal Grandfather     Colon cancer Maternal Grandfather     Ovarian cancer Paternal Grandfather     Brain cancer Paternal Grandfather     Kidney cancer Paternal Grandfather        Vital Signs  /84 (BP Location: Left arm, Patient Position: Sitting, Cuff Size: Adult)   Pulse 120   Temp 98.2 °F (36.8 °C) (Temporal)   Resp 18   Ht 175.3 cm (69\")   Wt 115 kg (253 lb)   SpO2 96%   BMI 37.36 kg/m²   Estimated body mass index is 37.36 kg/m² as calculated from the following:    Height as of this encounter: 175.3 cm (69\").    Weight as of this encounter: 115 kg (253 lb).  Facility age limit for growth %albina is 20 years.    Physical Exam  Vitals reviewed.   Constitutional:       Appearance: He is ill-appearing.   HENT:      Head: Normocephalic and atraumatic.      Right Ear: Tympanic membrane, ear canal and external ear normal.      Left Ear: Tympanic membrane, ear canal and external ear normal.      Nose: Congestion and rhinorrhea present.      Mouth/Throat:      Pharynx: Oropharynx is clear. Posterior oropharyngeal erythema present.   Eyes:      Conjunctiva/sclera: Conjunctivae normal.      Pupils: Pupils are equal, round, and " reactive to light.   Cardiovascular:      Rate and Rhythm: Normal rate and regular rhythm.      Pulses: Normal pulses.      Heart sounds: Normal heart sounds.   Pulmonary:      Effort: Pulmonary effort is normal.      Breath sounds: Normal breath sounds. No wheezing or rhonchi.   Abdominal:      General: Bowel sounds are normal.      Palpations: Abdomen is soft.   Musculoskeletal:      Cervical back: Neck supple.   Skin:     General: Skin is warm and dry.   Neurological:      Mental Status: He is alert and oriented to person, place, and time.               POCT Results (if applicable):  Results for orders placed or performed in visit on 09/20/18   Alpha - 1 - Antitrypsin    Collection Time: 09/20/18  2:12 PM    Specimen: Blood   Result Value Ref Range    A-1 Antitrypsin 144 90 - 200 mg/dL   Alpha - 1 - Antitrypsin Phenotype    Collection Time: 09/20/18  2:12 PM    Specimen: Blood   Result Value Ref Range    A-1 Antitrypsin 143 90 - 200 mg/dL    Phenotype MM    aPTT    Collection Time: 09/20/18  2:12 PM    Specimen: Blood   Result Value Ref Range    PTT 28.0 24.0 - 31.0 seconds   Protime-INR    Collection Time: 09/20/18  2:12 PM    Specimen: Blood   Result Value Ref Range    Protime 10.3 9.6 - 11.5 Seconds    INR 0.98 0.91 - 1.09   Comprehensive Metabolic Panel    Collection Time: 09/20/18  2:12 PM    Specimen: Blood   Result Value Ref Range    Glucose 84 70 - 100 mg/dL    BUN 12 9 - 23 mg/dL    Creatinine 0.84 0.60 - 1.30 mg/dL    Sodium 138 132 - 146 mmol/L    Potassium 3.8 3.5 - 5.5 mmol/L    Chloride 103 99 - 109 mmol/L    CO2 28.0 20.0 - 31.0 mmol/L    Calcium 9.4 8.7 - 10.4 mg/dL    Total Protein 7.2 5.7 - 8.2 g/dL    Albumin 4.80 3.20 - 4.80 g/dL    ALT (SGPT) 27 7 - 40 U/L    AST (SGOT) 29 0 - 33 U/L    Alkaline Phosphatase 73 25 - 100 U/L    Total Bilirubin 1.0 0.3 - 1.2 mg/dL    eGFR Non African Amer 108 >60 mL/min/1.73    Globulin 2.4 gm/dL    A/G Ratio 2.0 1.5 - 2.5 g/dL    BUN/Creatinine Ratio 14.3 7.0 -  25.0    Anion Gap 7.0 3.0 - 11.0 mmol/L   CBC Auto Differential    Collection Time: 09/20/18  2:12 PM    Specimen: Blood   Result Value Ref Range    WBC 10.17 3.50 - 10.80 10*3/mm3    RBC 5.46 4.20 - 5.76 10*6/mm3    Hemoglobin 16.3 13.1 - 17.5 g/dL    Hematocrit 47.9 38.9 - 50.9 %    MCV 87.7 80.0 - 99.0 fL    MCH 29.9 27.0 - 31.0 pg    MCHC 34.0 32.0 - 36.0 g/dL    RDW 12.6 11.3 - 14.5 %    RDW-SD 39.9 37.0 - 54.0 fl    MPV 12.6 (H) 6.0 - 12.0 fL    Platelets 217 150 - 450 10*3/mm3    Neutrophil % 63.5 41.0 - 71.0 %    Lymphocyte % 25.7 24.0 - 44.0 %    Monocyte % 7.9 0.0 - 12.0 %    Eosinophil % 2.6 0.0 - 3.0 %    Basophil % 0.3 0.0 - 1.0 %    Immature Grans % 0.4 0.0 - 0.6 %    Neutrophils, Absolute 6.47 1.50 - 8.30 10*3/mm3    Lymphocytes, Absolute 2.61 0.60 - 4.80 10*3/mm3    Monocytes, Absolute 0.80 0.00 - 1.00 10*3/mm3    Eosinophils, Absolute 0.26 0.00 - 0.30 10*3/mm3    Basophils, Absolute 0.03 0.00 - 0.20 10*3/mm3    Immature Grans, Absolute 0.04 (H) 0.00 - 0.03 10*3/mm3            Assessment and Plan     Diagnoses and all orders for this visit:    1. Bronchitis (Primary)  Assessment & Plan:  Patient with prolonged course of congestion in the chest which initially showed improvement but has now showed worsening over the last 3 to 4 days.  He is now having discomfort when taking deep inspirations.  Will treat for potential bacterial pathogen with azithromycin Z-Kojo.  Patient is also being sent for chest PA and lateral to rule out infiltrates.  Will treat with 40 mg daily prednisone for the next 7 days.  Patient also being given Bromfed for cough and congestion.  Patient will be contacted with his x-ray results when available.    Orders:  -     XR Chest PA & Lateral; Future  -     azithromycin (Zithromax Z-Kojo) 250 MG tablet; Take 2 tablets by mouth on day 1, then 1 tablet daily on days 2-5  Dispense: 6 tablet; Refill: 0  -     brompheniramine-pseudoephedrine-DM 30-2-10 MG/5ML syrup; Take 10 mL by mouth 4  (Four) Times a Day As Needed for Congestion or Cough.  Dispense: 200 mL; Refill: 0  -     predniSONE (DELTASONE) 20 MG tablet; Take 2 tablets by mouth Daily for 7 days.  Dispense: 14 tablet; Refill: 0          Follow Up  No follow-ups on file.    Jenifer Le, APRN

## 2024-11-25 DIAGNOSIS — I51.7 CARDIOMEGALY: Primary | ICD-10-CM

## 2024-11-26 DIAGNOSIS — J40 BRONCHITIS: ICD-10-CM

## 2024-11-26 RX ORDER — AZITHROMYCIN 250 MG/1
TABLET, FILM COATED ORAL
Qty: 6 TABLET | Refills: 0 | OUTPATIENT
Start: 2024-11-26

## 2024-11-26 NOTE — TELEPHONE ENCOUNTER
Caller: Errol Howard    Relationship: Emergency Contact    Best call back number: [phone]     Requested Prescriptions:   Requested Prescriptions     Pending Prescriptions Disp Refills    azithromycin (Zithromax Z-Kojo) 250 MG tablet 6 tablet 0     Sig: Take 2 tablets by mouth on day 1, then 1 tablet daily on days 2-5        Pharmacy where request should be sent: [address] [phone] [facility] [phone]      Last office visit with prescribing clinician: 11/21/2024   Last telemedicine visit with prescribing clinician: Visit date not found   Next office visit with prescribing clinician: Visit date not found     Does the patient have less than a 3 day supply:  [] Yes  [] No    Would you like a call back once the refill request has been completed: [] Yes [] No    If the office needs to give you a call back, can they leave a voicemail: [] Yes [] No    Paulina Moy Rep   11/26/24 09:21 EST

## 2024-12-02 ENCOUNTER — TELEPHONE (OUTPATIENT)
Dept: FAMILY MEDICINE CLINIC | Facility: CLINIC | Age: 34
End: 2024-12-02
Payer: COMMERCIAL

## 2024-12-02 NOTE — TELEPHONE ENCOUNTER
Caller: Errol Howard    Relationship: Emergency Contact    Best call back number: [phone]    What is the best time to reach you: ANY    Who are you requesting to speak with (clinical staff, provider,  specific staff member):     CLINICAL    What was the call regarding:     CARDIOLOGIST    PLEASE CALL TO DISCUSS

## 2024-12-03 ENCOUNTER — TELEPHONE (OUTPATIENT)
Dept: FAMILY MEDICINE CLINIC | Facility: CLINIC | Age: 34
End: 2024-12-03
Payer: COMMERCIAL

## 2024-12-03 NOTE — TELEPHONE ENCOUNTER
Pts marlyn Suggs called the office wanting Deilsa to call cardiology and reschedule his appt that is scheduled for today for them because cardiology won't speak to her because he has not been seen there yet and she is not on a verbal release allowing them to speak with her. I informed her that Delisa is out of the office and he would need to call himself. She said so you all cannot call and reschedule it for him. I said no ma'am we cannot once we make the initial appointment it is his responsibility to reschedule if needed. And she hung up on me.

## 2024-12-10 ENCOUNTER — OFFICE VISIT (OUTPATIENT)
Dept: CARDIOLOGY | Facility: CLINIC | Age: 34
End: 2024-12-10
Payer: COMMERCIAL

## 2024-12-10 VITALS
HEIGHT: 69 IN | OXYGEN SATURATION: 98 % | DIASTOLIC BLOOD PRESSURE: 66 MMHG | WEIGHT: 253.3 LBS | HEART RATE: 115 BPM | BODY MASS INDEX: 37.52 KG/M2 | SYSTOLIC BLOOD PRESSURE: 112 MMHG

## 2024-12-10 DIAGNOSIS — I51.7 CARDIOMEGALY: ICD-10-CM

## 2024-12-10 DIAGNOSIS — R06.02 SHORTNESS OF BREATH: Primary | ICD-10-CM

## 2024-12-10 PROCEDURE — 99214 OFFICE O/P EST MOD 30 MIN: CPT | Performed by: NURSE PRACTITIONER

## 2024-12-10 PROCEDURE — 93000 ELECTROCARDIOGRAM COMPLETE: CPT | Performed by: NURSE PRACTITIONER

## 2024-12-10 NOTE — PROGRESS NOTES
[facility] Provider Note     Date:   12/10/2024   Name: Bhavik Dewey  :   1990  PCP: Jenifer Le APRN    Chief Complaint   Patient presents with    Establish Care     CARDIAC CONSULT, CARDIOMEGALY. PT REPORTS OCASSIONAL CHEST PAIN AND SHORTNESS OF BREATH  NECK SIZE: 17.5 IN       Subjective     History of Present Illness  Bhavik Dewey is a 34 y.o. male who presents today for new patient evaluation for cardiomegaly.  Patient was recently diagnosed with pneumonia, pleural effusion and cardiomegaly noted on chest x-ray.  He was evaluated in the ER at [facility] and discharged home on Zithromax and prednisone.  He reports that his symptoms of shortness of breath and chest pain are improving.  He describes the chest pain as a sharp pain that is worse with coughing and changing positions.  He has no previous cardiac history.  He reports a family history of CAD in his mother and grandfather.  His mom had an MI with stents in her 50s.  He denies palpitations, dizziness or syncope.      No specialty comments available.     Reports Denies   Chest Pain [x] []   Shortness of Air [x] []   Palpitations [] [x]   Edema [x] []   Dizziness [] [x]   Syncope [] [x]       Allergies   Allergen Reactions    Amoxicillin Hives    Ceclor [Cefaclor] Hives     No current outpatient medications on file.    Past Medical History:   Diagnosis Date    Abnormal CT scan     2012, with an incidental pulmonary nodule involving the left lung base measuring 1 cm.  As a result no CT scan for comparison, three-month followup recommended.  Never carried out.  Thought at that time was that it was very likely histoplasmosis.    Achalasia     status post Heller myotomy , as discussed above.    Allergic rhinitis     Essential hypertension     GERD (gastroesophageal reflux disease)     status post EGD Dr. Kowalski 2007, esophagitis.    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     Venous insufficiency (chronic) (peripheral)     Viral infection       Past Surgical History:   Procedure Laterality Date    ENDOSCOPY  02/09/2007    HELLER MYOTOMY LAPAROSCOPIC WITH ENDOSCOPY  2005     Family History   Problem Relation Age of Onset    Diabetes Mother     Diabetes Maternal Grandmother     Kidney disease Maternal Grandmother     Heart disease Maternal Grandmother     Diabetes Maternal Grandfather     Congenital heart disease Maternal Grandfather     Colon cancer Maternal Grandfather     Ovarian cancer Paternal Grandfather     Brain cancer Paternal Grandfather     Kidney cancer Paternal Grandfather      Social History     Socioeconomic History    Marital status: Single   Tobacco Use    Smoking status: Every Day     Current packs/day: 1.00     Average packs/day: 1 pack/day for 14.0 years (14.0 ttl pk-yrs)     Types: Electronic Cigarette, Cigarettes    Smokeless tobacco: Never   Vaping Use    Vaping status: Never Used   Substance and Sexual Activity    Alcohol use: Yes     Alcohol/week: 2.0 - 3.0 standard drinks of alcohol     Types: 2 - 3 Shots of liquor per week    Drug use: No    Sexual activity: Defer       Objective     Vital Signs:  /66 (BP Location: Right arm, Patient Position: Sitting, Cuff Size: Large Adult)   Pulse 115   Ht 175.3 cm (69\")   Wt 115 kg (253 lb 4.8 oz)   SpO2 98%   BMI 37.41 kg/m²   Estimated body mass index is 37.41 kg/m² as calculated from the following:    Height as of this encounter: 175.3 cm (69\").    Weight as of this encounter: 115 kg (253 lb 4.8 oz).       Class 2 Severe Obesity (BMI >=35 and <=39.9). Obesity-related health conditions include the following: none. Obesity is unchanged. BMI is is above average; BMI management plan is completed. We discussed portion control and increasing exercise.      Physical Exam  Vitals reviewed.   Constitutional:       Appearance: Normal appearance.   HENT:      Head: " Normocephalic.   Cardiovascular:      Rate and Rhythm: Normal rate and regular rhythm.      Heart sounds: Normal heart sounds.   Pulmonary:      Effort: Pulmonary effort is normal.      Breath sounds: Normal breath sounds.   Musculoskeletal:      Right lower leg: No edema.      Left lower leg: No edema.   Skin:     General: Skin is warm and dry.      Capillary Refill: Capillary refill takes less than 2 seconds.   Neurological:      General: No focal deficit present.      Mental Status: He is alert and oriented to person, place, and time.   Psychiatric:         Mood and Affect: Mood normal.         Behavior: Behavior normal.           Recent hospitalization notes reviewed: Recent ER notes reviewed      ECG 12 Lead    Date/Time: 12/10/2024 4:02 PM  Performed by: Lexy Pierre APRN    Authorized by: Lexy Pierre APRN  Rhythm: sinus tachycardia  Rate: tachycardic  BPM: 107  QRS axis: normal  Other findings: T wave abnormality    Clinical impression: abnormal EKG           Assessment and Plan     Diagnoses and all orders for this visit:    1. Shortness of breath (Primary)  Assessment & Plan:  Patient is recovering from pneumonia.  Shortness of breath somewhat better but he is still experiencing dyspnea on exertion and tachycardia.    - Check echocardiogram    Orders:  -     Adult Transthoracic Echo Complete W/ Cont if Necessary Per Protocol; Future  -     ECG 12 Lead    2. Cardiomegaly  Assessment & Plan:  Cardiomegaly noted on recent chest x-ray.    - Echocardiogram for further evaluation    Orders:  -     Adult Transthoracic Echo Complete W/ Cont if Necessary Per Protocol; Future        Recommendations: ER if symptoms increase and Report if any new/changing symptoms immediately          Follow Up  Return in about 4 weeks (around 1/7/2025) for cardiac testing results.  Patient was given instructions and counseling regarding his condition or for health maintenance advice. Please see specific information  pulled into the AVS if appropriate.

## 2024-12-11 ENCOUNTER — TELEPHONE (OUTPATIENT)
Dept: CARDIOLOGY | Facility: CLINIC | Age: 34
End: 2024-12-11
Payer: COMMERCIAL

## 2024-12-11 NOTE — ASSESSMENT & PLAN NOTE
Patient is recovering from pneumonia.  Shortness of breath somewhat better but he is still experiencing dyspnea on exertion and tachycardia.    - Check echocardiogram

## 2024-12-23 ENCOUNTER — OFFICE VISIT (OUTPATIENT)
Dept: CARDIOLOGY | Facility: CLINIC | Age: 34
End: 2024-12-23
Payer: COMMERCIAL

## 2024-12-23 VITALS
OXYGEN SATURATION: 97 % | DIASTOLIC BLOOD PRESSURE: 70 MMHG | HEART RATE: 102 BPM | WEIGHT: 249 LBS | HEIGHT: 69 IN | SYSTOLIC BLOOD PRESSURE: 118 MMHG | BODY MASS INDEX: 36.88 KG/M2

## 2024-12-23 DIAGNOSIS — Z82.49 FAMILY HISTORY OF PREMATURE CAD: ICD-10-CM

## 2024-12-23 DIAGNOSIS — R06.02 SHORTNESS OF BREATH: Primary | ICD-10-CM

## 2024-12-23 DIAGNOSIS — R94.31 ABNORMAL EKG: ICD-10-CM

## 2024-12-23 PROCEDURE — 99214 OFFICE O/P EST MOD 30 MIN: CPT | Performed by: NURSE PRACTITIONER

## 2024-12-23 NOTE — ASSESSMENT & PLAN NOTE
He completed an echocardiogram today that revealed an LVEF of 56 to 60%, normal left ventricular diastolic function and no significant valvular regurgitation or stenosis.  He continues to experience shortness of breath and occasional chest pain.  He has a family history of premature CAD.  Other cardiac risk factors include hypertension and obesity    -Stress echocardiogram to rule out ischemia

## 2024-12-23 NOTE — ASSESSMENT & PLAN NOTE
He has a family history of premature CAD in his mother, grandfather and uncles. His mom had an MI with stents in her 50s.

## 2024-12-23 NOTE — PROGRESS NOTES
[facility] Provider Note     Date:   2024   Name: Bhavik Dewey  :   1990  PCP: Jenifer Le APRN    Chief Complaint   Patient presents with    Shortness of Breath     Pt is here today for results of echo results. Echo was performed today here at the office.        Subjective     History of Present Illness  Bhavik Dewey is a 34 y.o. male who presents today for follow-up on cardiomegaly and echocardiogram.  He was recently diagnosed with pneumonia, pleural effusion and cardiomegaly noted on chest x-ray.  He completed an echocardiogram today that revealed an LVEF of 56-60%, normal left ventricular diastolic function and no significant valvular regurgitation or stenosis.  He continues to experience shortness of breath and occasional chest pain.  He has a family history of premature CAD in his mother, grandfather and uncles. His mom had an MI with stents in her 50s. He denies palpitations, dizziness or syncope.     Echocardiogram 2024-LVEF 56 to 60%.  Left ventricular diastolic function is normal.  No significant valvular regurgitation or stenosis.  Normal RVSP.     Reports Denies   Chest Pain [x] []   Shortness of Air [x] []   Palpitations [] [x]   Edema [] [x]   Dizziness [] [x]   Syncope [] [x]       Allergies   Allergen Reactions    Amoxicillin Hives    Ceclor [Cefaclor] Hives     No current outpatient medications on file.    Past Medical History:   Diagnosis Date    Abnormal CT scan     2012, with an incidental pulmonary nodule involving the left lung base measuring 1 cm.  As a result no CT scan for comparison, three-month followup recommended.  Never carried out.  Thought at that time was that it was very likely histoplasmosis.    Achalasia     status post Heller myotomy , as discussed above.    Allergic rhinitis     Essential hypertension     GERD (gastroesophageal reflux disease)     status post EGD Dr. Kowalski 2007, esophagitis.    Obesity      "Sinusitis     Tobacco abuse     Varicose veins of left lower extremity with other complications     Venous insufficiency (chronic) (peripheral)     Viral infection       Past Surgical History:   Procedure Laterality Date    ENDOSCOPY  02/09/2007    HELLER MYOTOMY LAPAROSCOPIC WITH ENDOSCOPY  2005     Family History   Problem Relation Age of Onset    Diabetes Mother     Diabetes Maternal Grandmother     Kidney disease Maternal Grandmother     Heart disease Maternal Grandmother     Diabetes Maternal Grandfather     Congenital heart disease Maternal Grandfather     Colon cancer Maternal Grandfather     Ovarian cancer Paternal Grandfather     Brain cancer Paternal Grandfather     Kidney cancer Paternal Grandfather      Social History     Socioeconomic History    Marital status: Single   Tobacco Use    Smoking status: Every Day     Current packs/day: 1.00     Average packs/day: 1 pack/day for 14.0 years (14.0 ttl pk-yrs)     Types: Electronic Cigarette, Cigarettes     Passive exposure: Never    Smokeless tobacco: Never   Vaping Use    Vaping status: Never Used   Substance and Sexual Activity    Alcohol use: Yes     Alcohol/week: 2.0 - 3.0 standard drinks of alcohol     Types: 2 - 3 Shots of liquor per week    Drug use: No    Sexual activity: Defer       Objective     Vital Signs:  /70 (BP Location: Right arm, Patient Position: Sitting, Cuff Size: Adult)   Pulse 102   Ht 175.3 cm (69\")   Wt 113 kg (249 lb)   SpO2 97%   BMI 36.77 kg/m²   Estimated body mass index is 36.77 kg/m² as calculated from the following:    Height as of this encounter: 175.3 cm (69\").    Weight as of this encounter: 113 kg (249 lb).               Physical Exam  Vitals reviewed.   Constitutional:       Appearance: Normal appearance.   HENT:      Head: Normocephalic.   Cardiovascular:      Rate and Rhythm: Normal rate and regular rhythm.      Heart sounds: Normal heart sounds.   Pulmonary:      Effort: Pulmonary effort is normal.      " Breath sounds: Normal breath sounds.   Musculoskeletal:      Right lower leg: No edema.      Left lower leg: No edema.   Skin:     General: Skin is warm and dry.      Capillary Refill: Capillary refill takes less than 2 seconds.   Neurological:      General: No focal deficit present.      Mental Status: He is alert and oriented to person, place, and time.   Psychiatric:         Mood and Affect: Mood normal.         Behavior: Behavior normal.           Cardiology studies reviewed: Echocardiogram reviewed          Assessment and Plan     Diagnoses and all orders for this visit:    1. Shortness of breath (Primary)  Assessment & Plan:  He completed an echocardiogram today that revealed an LVEF of 56 to 60%, normal left ventricular diastolic function and no significant valvular regurgitation or stenosis.  He continues to experience shortness of breath and occasional chest pain.  He has a family history of premature CAD.  Other cardiac risk factors include hypertension and obesity    -Stress echocardiogram to rule out ischemia    Orders:  -     Adult Stress Echo W/ Cont or Stress Agent if Necessary Per Protocol; Future    2. Abnormal EKG  Assessment & Plan:  EKG from 12/10/2024 revealed sinus tachycardia with nonspecific T wave abnormality.    Orders:  -     Adult Stress Echo W/ Cont or Stress Agent if Necessary Per Protocol; Future    3. Family history of premature CAD  Assessment & Plan:   He has a family history of premature CAD in his mother, grandfather and uncles. His mom had an MI with stents in her 50s.    Orders:  -     Adult Stress Echo W/ Cont or Stress Agent if Necessary Per Protocol; Future        Recommendations: ER if symptoms increase and Report if any new/changing symptoms immediately          Follow Up  No follow-ups on file.  Patient was given instructions and counseling regarding his condition or for health maintenance advice. Please see specific information pulled into the AVS if appropriate.

## 2024-12-27 ENCOUNTER — OFFICE VISIT (OUTPATIENT)
Dept: FAMILY MEDICINE CLINIC | Facility: CLINIC | Age: 34
End: 2024-12-27
Payer: COMMERCIAL

## 2024-12-27 VITALS — TEMPERATURE: 98.2 F | WEIGHT: 247 LBS | BODY MASS INDEX: 36.48 KG/M2

## 2024-12-27 DIAGNOSIS — I51.7 CARDIOMEGALY: ICD-10-CM

## 2024-12-27 DIAGNOSIS — R05.9 COUGH, UNSPECIFIED TYPE: Primary | ICD-10-CM

## 2024-12-27 DIAGNOSIS — J40 BRONCHITIS: ICD-10-CM

## 2024-12-27 PROCEDURE — 99214 OFFICE O/P EST MOD 30 MIN: CPT | Performed by: NURSE PRACTITIONER

## 2024-12-27 RX ORDER — DOXYCYCLINE 100 MG/1
100 CAPSULE ORAL 2 TIMES DAILY
Qty: 14 CAPSULE | Refills: 0 | Status: SHIPPED | OUTPATIENT
Start: 2024-12-27 | End: 2025-01-03

## 2024-12-27 RX ORDER — PREDNISONE 20 MG/1
40 TABLET ORAL DAILY
Qty: 14 TABLET | Refills: 0 | Status: SHIPPED | OUTPATIENT
Start: 2024-12-27 | End: 2025-01-03

## 2024-12-27 NOTE — PROGRESS NOTES
Office Note     Name: Bhavik Dewey    : 1990     MRN: [id number]     Chief Complaint  Cough (Since last month )    Subjective     History of Present Illness:  Bhavik Dewey is a 34 y.o. male who presents today for ongoing cough. Patient last evaluated by me five weeks ago with prolonged course of chest congestion that has shown improvement but then again worsened.  Initially treated with course of azithromycin Z-Kojo and prednisolone.  During that visit chest xray PA and lateral showed left pleural effusion, cardiomegaly as well as a left lower lobe pneumonia.  Patient has now been evaluated by cardiology and cleared in regards to his cardiomegaly.  Unfortunately he has continued to have a significant cough and chest tightness.  Patient was followed in the past by pulmonary services.  In 2018 patient was experiencing hemoptysis, his issues initially began when he had an episode of acute bronchitis with continued cough and congestion, he then developed some intermittent hemoptysis, bright red.  He was noted to have a history of achalasia with surgical repair when he was 14.  During pulmonary workup patient was noted to have a calcified lymph node in his left hilum.  At that time they did discuss possibility of bronchoscopy for airway evaluation given the proximity of this calcified lymph node to his airway which he held off on at the time.  Patient had not had any further issues until 5 weeks ago.  He denies any fever or malaise.  His cough is productive of a significant amount of mucus.  No further complaints or concerns    Review of Systems   Constitutional:  Negative for chills, fatigue, fever and unexpected weight loss.   HENT:  Positive for sore throat. Negative for ear pain, postnasal drip, rhinorrhea and trouble swallowing.    Respiratory:  Positive for cough and chest tightness. Negative for choking, shortness of breath and wheezing.    Cardiovascular:  Negative for chest pain.  "  Gastrointestinal:  Negative for nausea, GERD and indigestion.   Musculoskeletal:  Negative for myalgias.   Skin:  Negative for rash.   Neurological:  Negative for dizziness, weakness, light-headedness and headache.       Objective     Past Medical History:   Diagnosis Date    Abnormal CT scan     4/23/2012, with an incidental pulmonary nodule involving the left lung base measuring 1 cm.  As a result no CT scan for comparison, three-month followup recommended.  Never carried out.  Thought at that time was that it was very likely histoplasmosis.    Achalasia     status post Heller myotomy 2005, as discussed above.    Allergic rhinitis     Essential hypertension     GERD (gastroesophageal reflux disease)     status post EGD Dr. Kowalski 2/9/2007, esophagitis.    Obesity     Sinusitis     Tobacco abuse     Varicose veins of left lower extremity with other complications     Venous insufficiency (chronic) (peripheral)     Viral infection      Past Surgical History:   Procedure Laterality Date    ENDOSCOPY  02/09/2007    HELLER MYOTOMY LAPAROSCOPIC WITH ENDOSCOPY  2005     Family History   Problem Relation Age of Onset    Diabetes Mother     Diabetes Maternal Grandmother     Kidney disease Maternal Grandmother     Heart disease Maternal Grandmother     Diabetes Maternal Grandfather     Congenital heart disease Maternal Grandfather     Colon cancer Maternal Grandfather     Ovarian cancer Paternal Grandfather     Brain cancer Paternal Grandfather     Kidney cancer Paternal Grandfather        Vital Signs  Temp 98.2 °F (36.8 °C) (Temporal)   Wt 112 kg (247 lb)   BMI 36.48 kg/m²   Estimated body mass index is 36.48 kg/m² as calculated from the following:    Height as of 12/23/24: 175.3 cm (69\").    Weight as of this encounter: 112 kg (247 lb).  Facility age limit for growth %albina is 20 years.    Physical Exam  Vitals reviewed.   HENT:      Head: Normocephalic and atraumatic.      Right Ear: Tympanic membrane, ear canal and " external ear normal.      Left Ear: Tympanic membrane, ear canal and external ear normal.      Nose: Nose normal.      Mouth/Throat:      Mouth: Mucous membranes are moist.      Pharynx: Oropharynx is clear.   Eyes:      Conjunctiva/sclera: Conjunctivae normal.      Pupils: Pupils are equal, round, and reactive to light.   Cardiovascular:      Rate and Rhythm: Normal rate and regular rhythm.      Pulses: Normal pulses.      Heart sounds: Normal heart sounds.   Pulmonary:      Effort: Pulmonary effort is normal.      Breath sounds: Wheezing and rhonchi present.   Abdominal:      General: Bowel sounds are normal.      Palpations: Abdomen is soft.   Musculoskeletal:         General: Normal range of motion.      Cervical back: Neck supple.   Skin:     General: Skin is dry.   Neurological:      Mental Status: He is alert and oriented to person, place, and time.          POCT Results (if applicable):  Results for orders placed or performed in visit on 12/23/24   Adult Transthoracic Echo Complete W/ Cont if Necessary Per Protocol    Collection Time: 12/23/24  8:54 AM   Result Value Ref Range    EF(MOD-bp) 60.0 %    LVIDd 4.8 cm    LVIDs 3.2 cm    IVSd 0.93 cm    LVPWd 1.15 cm    FS 33.3 %    IVS/LVPW 0.81 cm    ESV(cubed) 33.7 ml    EDV(cubed) 113.6 ml    LV mass(C)d 182.2 grams    LVOT area 3.8 cm2    LVOT diam 2.20 cm    EDV(MOD-sp2) 122.0 ml    EDV(MOD-sp4) 146.0 ml    ESV(MOD-sp2) 40.8 ml    ESV(MOD-sp4) 50.6 ml    SV(MOD-sp2) 81.2 ml    SV(MOD-sp4) 95.4 ml    EF(MOD-sp2) 66.6 %    EF(MOD-sp4) 65.3 %    MV E max nhan 78.7 cm/sec    MV A max nhan 61.4 cm/sec    MV dec time 0.23 sec    MV E/A 1.28     Med Peak E' Nhan 10.4 cm/sec    Lat Peak E' Nhan 11.7 cm/sec    Avg E/e' ratio 7.12     SV(LVOT) 96.6 ml    RVIDd 2.7 cm    RV Base 4.1 cm    RV Mid 3.6 cm    RV Length 6.3 cm    TAPSE (>1.6) 2.29 cm    LA dimension (2D)  3.0 cm    LV V1 max 117.0 cm/sec    LV V1 max PG 5.5 mmHg    LV V1 mean PG 3.0 mmHg    LV V1 VTI 25.4 cm     Ao pk israel 152.0 cm/sec    Ao max PG 9.2 mmHg    Ao mean PG 5.0 mmHg    Ao V2 VTI 36.2 cm    CORIE(I,D) 2.7 cm2    MV max PG 4.7 mmHg    MV mean PG 2.00 mmHg    MV V2 VTI 21.2 cm    MVA(VTI) 4.6 cm2    MV dec slope 350.0 cm/sec2    PA V2 max 111.0 cm/sec    PA acc time 0.12 sec    Ao root diam 3.5 cm    Sinus 3.3 cm    Ascending aorta 3.3 cm            Assessment and Plan     Diagnoses and all orders for this visit:    1. Cough, unspecified type (Primary)  -     Gram Stain With / Sputum Cult Rflx ([facility]) - Sputum, Trachea; Future  -     Gram Stain With / Sputum Cult Rflx ([facility]) - Sputum, Trachea    2. Cardiomegaly  Assessment & Plan:  Incidental finding on chest PA and lateral obtained during respiratory illness.  Patient subsequently referred to Dr. Jorge for further evaluation.  Patient underwent echocardiogram with findings of LVEF of 56-60%, normal left ventricular diastolic function and no significant valvular regurgitation or stenosis.  Given ongoing complaints in the cardiology office of some chest discomfort and shortness of breath he is now scheduled for stress echo.      3. Bronchitis  Assessment & Plan:  Patient has been battling chest congestion and cough for approximately 2 months now.  Last evaluated by me approximately 4 weeks ago at which time he had already been experiencing a prolonged course of congestion and chest which has showed some improvement but worsened 3 to 4 days prior to presentation.  Patient was confirmed to have a left low lobe pneumonia on chest PA and lateral.  Subsequently treated with azithromycin Z-Kojo and prednisone.  He was also given Bromfed for cough and congestion.  Patient is accompanied by his spouse today who states that he may have showed a little improvement initially but has never completely resolved.  -Will treat with round of doxycycline and oral steroid therapy.  Patient also being given sample of Trelegy inhaler to utilize for the next 2 weeks.  I am also going  to obtain sputum culture for review.  His symptoms persist we will likely refer to pulmonary services as he has required their evaluation in the past, noted to have a calcified lymph node in the left hilar region that could need further evaluation.  Bronchoscopy was suggested in the past.      Other orders  -     predniSONE (DELTASONE) 20 MG tablet; Take 2 tablets by mouth Daily for 7 days.  Dispense: 14 tablet; Refill: 0  -     doxycycline (VIBRAMYCIN) 100 MG capsule; Take 1 capsule by mouth 2 (Two) Times a Day for 7 days.  Dispense: 14 capsule; Refill: 0           Follow Up  No follow-ups on file.    Jenifer Le, APRN

## 2024-12-31 NOTE — ASSESSMENT & PLAN NOTE
Patient has been battling chest congestion and cough for approximately 2 months now.  Last evaluated by me approximately 4 weeks ago at which time he had already been experiencing a prolonged course of congestion and chest which has showed some improvement but worsened 3 to 4 days prior to presentation.  Patient was confirmed to have a left low lobe pneumonia on chest PA and lateral.  Subsequently treated with azithromycin Z-Kojo and prednisone.  He was also given Bromfed for cough and congestion.  Patient is accompanied by his spouse today who states that he may have showed a little improvement initially but has never completely resolved.  -Will treat with round of doxycycline and oral steroid therapy.  Patient also being given sample of Trelegy inhaler to utilize for the next 2 weeks.  I am also going to obtain sputum culture for review.  His symptoms persist we will likely refer to pulmonary services as he has required their evaluation in the past, noted to have a calcified lymph node in the left hilar region that could need further evaluation.  Bronchoscopy was suggested in the past.

## 2024-12-31 NOTE — ASSESSMENT & PLAN NOTE
Incidental finding on chest PA and lateral obtained during respiratory illness.  Patient subsequently referred to Dr. Jorge for further evaluation.  Patient underwent echocardiogram with findings of LVEF of 56-60%, normal left ventricular diastolic function and no significant valvular regurgitation or stenosis.  Given ongoing complaints in the cardiology office of some chest discomfort and shortness of breath he is now scheduled for stress echo.   Report given to BRIAN Lopez RN, care transferred.

## 2025-01-02 ENCOUNTER — TELEPHONE (OUTPATIENT)
Dept: FAMILY MEDICINE CLINIC | Facility: CLINIC | Age: 35
End: 2025-01-02
Payer: COMMERCIAL

## 2025-01-02 LAB
BACTERIA SPT CULT: NORMAL
BACTERIA SPT CULT: NORMAL
GRAM STN SPT: ABNORMAL
SQUAMOUS SPT QL MICRO: ABNORMAL
WBC SPT QL MICRO: ABNORMAL

## 2025-01-02 NOTE — TELEPHONE ENCOUNTER
Called and left patient a message advising him of results and the medicine he needs to complete. If he has any questions or concerns to call the office

## 2025-01-02 NOTE — TELEPHONE ENCOUNTER
----- Message from Jenifer Le sent at 1/2/2025  5:09 PM EST -----  Please let Bhavik know the anitbiotics he has been given will cover the bacterial growth from his sputum culuture. If he is not feeling better I want to have him see pulmonary